# Patient Record
Sex: MALE | Race: BLACK OR AFRICAN AMERICAN | NOT HISPANIC OR LATINO | Employment: STUDENT | URBAN - METROPOLITAN AREA
[De-identification: names, ages, dates, MRNs, and addresses within clinical notes are randomized per-mention and may not be internally consistent; named-entity substitution may affect disease eponyms.]

---

## 2017-01-05 ENCOUNTER — OFFICE VISIT (OUTPATIENT)
Dept: INTERNAL MEDICINE | Facility: CLINIC | Age: 22
End: 2017-01-05
Payer: OTHER GOVERNMENT

## 2017-01-05 ENCOUNTER — HOSPITAL ENCOUNTER (OUTPATIENT)
Dept: CARDIOLOGY | Facility: HOSPITAL | Age: 22
Discharge: HOME | End: 2017-01-05
Attending: SURGERY
Payer: OTHER GOVERNMENT

## 2017-01-05 ENCOUNTER — HOSPITAL ENCOUNTER (OUTPATIENT)
Dept: RADIOLOGY | Facility: HOSPITAL | Age: 22
Discharge: HOME | End: 2017-01-05
Attending: INTERNAL MEDICINE
Payer: OTHER GOVERNMENT

## 2017-01-05 VITALS
TEMPERATURE: 98 F | HEIGHT: 72 IN | BODY MASS INDEX: 20.63 KG/M2 | OXYGEN SATURATION: 98 % | WEIGHT: 152.31 LBS | HEART RATE: 80 BPM | DIASTOLIC BLOOD PRESSURE: 71 MMHG | SYSTOLIC BLOOD PRESSURE: 115 MMHG

## 2017-01-05 DIAGNOSIS — R07.89 ATYPICAL CHEST PAIN: Primary | ICD-10-CM

## 2017-01-05 DIAGNOSIS — R07.89 ATYPICAL CHEST PAIN: ICD-10-CM

## 2017-01-05 DIAGNOSIS — J45.991 COUGH VARIANT ASTHMA: ICD-10-CM

## 2017-01-05 PROCEDURE — 93005 ELECTROCARDIOGRAM TRACING: CPT

## 2017-01-05 PROCEDURE — 71020 XR CHEST PA AND LATERAL: CPT | Mod: TC

## 2017-01-05 PROCEDURE — 99202 OFFICE O/P NEW SF 15 MIN: CPT | Mod: S$PBB,,, | Performed by: INTERNAL MEDICINE

## 2017-01-05 PROCEDURE — 71020 XR CHEST PA AND LATERAL: CPT | Mod: 26,,, | Performed by: RADIOLOGY

## 2017-01-05 PROCEDURE — 99203 OFFICE O/P NEW LOW 30 MIN: CPT | Mod: PBBFAC,PO | Performed by: INTERNAL MEDICINE

## 2017-01-05 PROCEDURE — 99999 PR PBB SHADOW E&M-NEW PATIENT-LVL III: CPT | Mod: PBBFAC,,, | Performed by: INTERNAL MEDICINE

## 2017-01-05 RX ORDER — FLUTICASONE PROPIONATE 50 MCG
2 SPRAY, SUSPENSION (ML) NASAL DAILY
Qty: 1 BOTTLE | Refills: 0 | Status: SHIPPED | OUTPATIENT
Start: 2017-01-05 | End: 2023-12-15

## 2017-01-05 RX ORDER — FLUTICASONE PROPIONATE 110 UG/1
1 AEROSOL, METERED RESPIRATORY (INHALATION) 2 TIMES DAILY
Qty: 12 G | Refills: 0 | Status: SHIPPED | OUTPATIENT
Start: 2017-01-05 | End: 2018-01-05

## 2017-01-05 NOTE — PROGRESS NOTES
Subjective:    Portions of this note are generated with voice recognition software. Typographical errors may exist.   Patient ID: Chicho Oconnor III is a 21 y.o. male.    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  The pain is in the left precordial area and is principally there when he wakes up from sleep sleep.  The pain lasts for about 2-3 minutes and subsides as he gets up.  Initially the patient thought it was heartburn but he has no symptoms suggestive of reflux or heartburn-like symptoms.  The pain is also worse when he moves in the left side.  He denies any shoulder pain.  There is no history of chest pain or shortness of breath on exertion.  No history suggestive of GERD.  No history of cough for the past 3 months.  No history of sore throat or dysphagia The patient has a history of seasonal ALLERGIES predominantly in the fall and winter in his childhood.  He used to have nasal congestion for which she took Claritin or Zyrtec.  He also has a history of cough variant asthma with onset lead in childhood and a sixth grade.  He would use albuterol when necessary.  He would be symptomatic in the fall and winter for about 2-3 months.  His symptoms where persistent coughing.  He did not require any hospitalization.  Of note The patient has not had the symptoms this year.  The patient denies any history of palpitations.  He is a nonsmoker denies any alcohol intake or any recreational drug use.  He is here for the holidays and is returning to his university tomorrow.  Other than the cough variant asthma the patient was a healthy child  active in sports.  Past medical history: Is significant for cough variant asthma  ALLERGIES: Penicillin  Social history: As above  Family history: No history of congenital heart disease sudden cardiac death or conduction  disorders.  Review of patient's allergies indicates:   Allergen Reactions    Pcn [penicillins]      History reviewed. No pertinent past medical history.  History reviewed. No pertinent past surgical history.  History reviewed. No pertinent family history.  Social History     Social History    Marital status: Single     Spouse name: N/A    Number of children: N/A    Years of education: N/A     Occupational History    Not on file.     Social History Main Topics    Smoking status: Never Smoker    Smokeless tobacco: Not on file    Alcohol use No    Drug use: Not on file    Sexual activity: Not on file     Other Topics Concern    Not on file     Social History Narrative    No narrative on file     ROS: As above      Physical Exam       Vital signs reviewed  PE:   APPEARANCE: Well nourished, well developed, in no acute distress.   HEAD: Normocephalic, atraumatic.  EYES: PERRL. EOMI. Conjunctivae noninjected.  EARS: TM's intact. Light reflex normal. No retraction or perforation  NOSE: Mucosa pink. Airway clear.  MOUTH & THROAT: No tonsillar enlargement. No pharyngeal erythema or exudate.   NECK: Supple with no cervical lymphadenopathy. No carotid bruits. No thyromegaly  CHEST: Good inspiratory effort. Lungs clear to auscultation with no wheezes or crackles.  CARDIOVASCULAR: Normal S1, S2. No rubs, murmurs, or gallops.  ABDOMEN: Bowel sounds normal. Not distended. Soft. No tenderness or masses. No organomegaly.  EXTREMITIES: No edema, cyanosis, or clubbing.   Chest wall examination: No tenderness palpable chest wall or costochondral junctions.  Left shoulder examination: Range of movements normal.  No pain elicited on passive or active movements.  No tenderness shoulder joint.    Assessment:     Labs:  No results found for this or any previous visit (from the past 1008 hour(s)).  1. Chest pain with noncardiac features    2. Cough variant asthma    Chest pain noncardiac.  Not related to exertion.  Not related to  any pulmonary complaints.  No chest wall tenderness.  Reassured the patient.  Will check chest x-ray EKG and the following labs .  Prescribed Flonase and Flovent inhaler to be taken during the 3 months he is most symptomatic.  We will consider pulmonary function testing when he is visiting this time.    Plan:   Follow-up as required or to establish care   Orders Placed This Encounter   Procedures    X-Ray Chest PA And Lateral    CBC auto differential    Comprehensive metabolic panel    TSH    EKG 12-lead

## 2017-01-06 ENCOUNTER — TELEPHONE (OUTPATIENT)
Dept: FAMILY MEDICINE | Facility: CLINIC | Age: 22
End: 2017-01-06

## 2017-01-06 NOTE — TELEPHONE ENCOUNTER
----- Message from Mira Enriquez MD sent at 1/5/2017  3:23 PM CST -----  Reviewed the results of Chest X ray is normal.

## 2017-01-12 ENCOUNTER — TELEPHONE (OUTPATIENT)
Dept: FAMILY MEDICINE | Facility: CLINIC | Age: 22
End: 2017-01-12

## 2017-01-24 NOTE — TELEPHONE ENCOUNTER
Spoke to Chicho's mom and informed of results of tests. Also informed that the chest pain could be evaluated furthe with an achocardiogram. He is at present at [facility]

## 2021-06-04 ENCOUNTER — TELEPHONE (OUTPATIENT)
Dept: GASTROENTEROLOGY | Facility: CLINIC | Age: 26
End: 2021-06-04

## 2021-06-04 ENCOUNTER — TELEPHONE (OUTPATIENT)
Dept: FAMILY MEDICINE | Facility: CLINIC | Age: 26
End: 2021-06-04

## 2021-06-25 ENCOUNTER — OFFICE VISIT (OUTPATIENT)
Dept: GASTROENTEROLOGY | Facility: CLINIC | Age: 26
End: 2021-06-25
Payer: OTHER GOVERNMENT

## 2021-06-25 VITALS — WEIGHT: 147.69 LBS | BODY MASS INDEX: 20.03 KG/M2

## 2021-06-25 DIAGNOSIS — R10.9 ABDOMINAL PAIN, UNSPECIFIED ABDOMINAL LOCATION: Primary | ICD-10-CM

## 2021-06-25 DIAGNOSIS — R19.7 DIARRHEA, UNSPECIFIED TYPE: ICD-10-CM

## 2021-06-25 PROCEDURE — 99204 OFFICE O/P NEW MOD 45 MIN: CPT | Mod: S$PBB,,, | Performed by: INTERNAL MEDICINE

## 2021-06-25 PROCEDURE — 99999 PR PBB SHADOW E&M-EST. PATIENT-LVL II: CPT | Mod: PBBFAC,,, | Performed by: INTERNAL MEDICINE

## 2021-06-25 PROCEDURE — 99204 PR OFFICE/OUTPT VISIT, NEW, LEVL IV, 45-59 MIN: ICD-10-PCS | Mod: S$PBB,,, | Performed by: INTERNAL MEDICINE

## 2021-06-25 PROCEDURE — 99212 OFFICE O/P EST SF 10 MIN: CPT | Mod: PBBFAC,PO | Performed by: INTERNAL MEDICINE

## 2021-06-25 PROCEDURE — 99999 PR PBB SHADOW E&M-EST. PATIENT-LVL II: ICD-10-PCS | Mod: PBBFAC,,, | Performed by: INTERNAL MEDICINE

## 2021-06-25 RX ORDER — DICYCLOMINE HYDROCHLORIDE 10 MG/1
10 CAPSULE ORAL 4 TIMES DAILY PRN
Qty: 120 CAPSULE | Refills: 3 | Status: SHIPPED | OUTPATIENT
Start: 2021-06-25 | End: 2021-07-25

## 2023-06-30 ENCOUNTER — OFFICE VISIT (OUTPATIENT)
Dept: URGENT CARE | Facility: CLINIC | Age: 28
End: 2023-06-30

## 2023-06-30 VITALS
OXYGEN SATURATION: 98 % | BODY MASS INDEX: 22.35 KG/M2 | TEMPERATURE: 98 F | RESPIRATION RATE: 20 BRPM | HEIGHT: 72 IN | SYSTOLIC BLOOD PRESSURE: 122 MMHG | WEIGHT: 165 LBS | HEART RATE: 82 BPM | DIASTOLIC BLOOD PRESSURE: 70 MMHG

## 2023-06-30 DIAGNOSIS — H10.9 BACTERIAL CONJUNCTIVITIS: Primary | ICD-10-CM

## 2023-06-30 PROCEDURE — 99203 OFFICE O/P NEW LOW 30 MIN: CPT | Mod: S$GLB,,,

## 2023-06-30 PROCEDURE — 99203 PR OFFICE/OUTPT VISIT, NEW, LEVL III, 30-44 MIN: ICD-10-PCS | Mod: S$GLB,,,

## 2023-06-30 RX ORDER — POLYMYXIN B SULFATE AND TRIMETHOPRIM 1; 10000 MG/ML; [USP'U]/ML
1 SOLUTION OPHTHALMIC EVERY 6 HOURS
Qty: 1.8667 ML | Refills: 0 | Status: SHIPPED | OUTPATIENT
Start: 2023-06-30 | End: 2023-07-07

## 2023-06-30 NOTE — PROGRESS NOTES
Subjective:      Patient ID: Chicho Oconnor III is a 27 y.o. male.    Vitals:  height is 6' (1.829 m) and weight is 74.8 kg (165 lb). His oral temperature is 98.3 °F (36.8 °C). His blood pressure is 122/70 and his pulse is 82. His respiration is 20 and oxygen saturation is 98%.     Chief Complaint: Eye Problem (Left)    27 year old male patient presents with left eye watering and redness x10 days. Patient states that it is also itchy. Patient states that every now and then it feels like there is something in it. Patient states only when he wakes up is his eyes are sensitive to the light.    Eye Problem   The left eye is affected. This is a new problem. The current episode started 1 to 4 weeks ago (10 days ago). The problem occurs intermittently. The problem has been gradually worsening. There was no injury mechanism. The pain is at a severity of 0/10. The patient is experiencing no pain. There is No known exposure to pink eye. He Does not wear contacts. Associated symptoms include blurred vision, eye redness and a foreign body sensation. Pertinent negatives include no eye discharge, fever, itching, nausea, recent URI or vomiting. He has tried eye drops for the symptoms. The treatment provided no relief.     Constitution: Negative for fever.   Eyes:  Positive for eye redness and blurred vision. Negative for eye discharge and eye itching.   Gastrointestinal:  Negative for nausea and vomiting.    Objective:     Physical Exam   Constitutional: He is oriented to person, place, and time. He appears well-developed.   HENT:   Head: Normocephalic and atraumatic.   Ears:   Right Ear: External ear normal.   Left Ear: External ear normal.   Nose: Nose normal.   Mouth/Throat: Oropharynx is clear and moist.   Eyes: EOM and lids are normal. Pupils are equal, round, and reactive to light. Right eye exhibits no discharge. Left eye exhibits no discharge and no exudate. Right conjunctiva is not injected. Left conjunctiva is injected.  Right eye exhibits normal extraocular motion. Left eye exhibits normal extraocular motion. Extraocular movement intact      Comments: Woods lamp exam performed and no ulcers or abrasions noted   Neck: Trachea normal and phonation normal. Neck supple.   Cardiovascular: Normal heart sounds.   Pulmonary/Chest: Breath sounds normal.   Musculoskeletal: Normal range of motion.         General: Normal range of motion.   Neurological: He is alert and oriented to person, place, and time.   Skin: Skin is warm, dry and intact.   Psychiatric: His speech is normal and behavior is normal. Judgment and thought content normal.   Nursing note and vitals reviewed.    Assessment:     1. Bacterial conjunctivitis        Plan:       Bacterial conjunctivitis  -     polymyxin B sulf-trimethoprim (POLYTRIM) 10,000 unit- 1 mg/mL Drop; Place 1 drop into the left eye every 6 (six) hours. for 7 days  Dispense: 1.8667 mL; Refill: 0      Patient Instructions   PLEASE READ YOUR DISCHARGE INSTRUCTIONS ENTIRELY AS IT CONTAINS IMPORTANT INFORMATION.     Use the antibiotic drops 4 times daily for 7 days - do not use past 10 days.      Keep hands clean.      Can use saline drops throughout the day if eyes feel dry or irritated.         Please return or see your primary care doctor if you develop new or worsening symptoms (vision changes, pain, fever, swelling around your eye).      Please arrange follow up with your primary medical clinic as soon as possible. You must understand that you've received an Urgent Care treatment only and that you may be released before all of your medical problems are known or treated. You, the patient, will arrange for follow up as instructed. If your symptoms worsen or fail to improve you should go to the Emergency Room.  WE CANNOT RULE OUT ALL POSSIBLE CAUSES OF YOUR SYMPTOMS IN THE URGENT CARE SETTING PLEASE GO TO THE ER IF YOU FEELS YOUR CONDITION IS WORSENING OR YOU WOULD LIKE EMERGENT EVALUATION.

## 2023-07-06 ENCOUNTER — TELEPHONE (OUTPATIENT)
Dept: URGENT CARE | Facility: CLINIC | Age: 28
End: 2023-07-06

## 2023-07-18 ENCOUNTER — HOSPITAL ENCOUNTER (EMERGENCY)
Facility: HOSPITAL | Age: 28
Discharge: ANOTHER HOSPITAL | End: 2023-07-18
Attending: EMERGENCY MEDICINE

## 2023-07-18 ENCOUNTER — HOSPITAL ENCOUNTER (EMERGENCY)
Facility: HOSPITAL | Age: 28
Discharge: HOME | End: 2023-07-18
Attending: EMERGENCY MEDICINE

## 2023-07-18 VITALS
RESPIRATION RATE: 16 BRPM | OXYGEN SATURATION: 99 % | WEIGHT: 165 LBS | DIASTOLIC BLOOD PRESSURE: 76 MMHG | SYSTOLIC BLOOD PRESSURE: 146 MMHG | HEART RATE: 90 BPM | TEMPERATURE: 98 F | BODY MASS INDEX: 22.35 KG/M2 | HEIGHT: 72 IN

## 2023-07-18 VITALS
TEMPERATURE: 99 F | RESPIRATION RATE: 16 BRPM | HEART RATE: 70 BPM | DIASTOLIC BLOOD PRESSURE: 75 MMHG | SYSTOLIC BLOOD PRESSURE: 133 MMHG | OXYGEN SATURATION: 98 %

## 2023-07-18 DIAGNOSIS — H53.132 ACUTE LOSS OF VISION, LEFT: Primary | ICD-10-CM

## 2023-07-18 DIAGNOSIS — H20.9 UVEITIS: Primary | ICD-10-CM

## 2023-07-18 DIAGNOSIS — H10.32 ACUTE CONJUNCTIVITIS OF LEFT EYE, UNSPECIFIED ACUTE CONJUNCTIVITIS TYPE: ICD-10-CM

## 2023-07-18 PROCEDURE — 25000003 PHARM REV CODE 250: Performed by: PHYSICIAN ASSISTANT

## 2023-07-18 PROCEDURE — 25000003 PHARM REV CODE 250: Performed by: EMERGENCY MEDICINE

## 2023-07-18 PROCEDURE — 25000003 PHARM REV CODE 250: Performed by: STUDENT IN AN ORGANIZED HEALTH CARE EDUCATION/TRAINING PROGRAM

## 2023-07-18 PROCEDURE — 99285 EMERGENCY DEPT VISIT HI MDM: CPT

## 2023-07-18 PROCEDURE — 99283 EMERGENCY DEPT VISIT LOW MDM: CPT | Mod: 27

## 2023-07-18 RX ORDER — PROPARACAINE HYDROCHLORIDE 5 MG/ML
1 SOLUTION/ DROPS OPHTHALMIC
Status: COMPLETED | OUTPATIENT
Start: 2023-07-18 | End: 2023-07-18

## 2023-07-18 RX ORDER — PREDNISOLONE ACETATE 10 MG/ML
1 SUSPENSION/ DROPS OPHTHALMIC
Status: DISCONTINUED | OUTPATIENT
Start: 2023-07-18 | End: 2023-07-19 | Stop reason: HOSPADM

## 2023-07-18 RX ADMIN — FLUORESCEIN SODIUM 1 EACH: 1 STRIP OPHTHALMIC at 08:07

## 2023-07-18 RX ADMIN — FLUORESCEIN SODIUM 1 EACH: 1 STRIP OPHTHALMIC at 04:07

## 2023-07-18 RX ADMIN — FLUORESCEIN SODIUM 1 EACH: 1 STRIP OPHTHALMIC at 03:07

## 2023-07-18 RX ADMIN — PROPARACAINE HYDROCHLORIDE 1 DROP: 5 SOLUTION/ DROPS OPHTHALMIC at 08:07

## 2023-07-18 RX ADMIN — PROPARACAINE HYDROCHLORIDE 1 DROP: 5 SOLUTION/ DROPS OPHTHALMIC at 03:07

## 2023-07-18 RX ADMIN — PREDNISOLONE ACETATE 1 DROP: 10 SUSPENSION/ DROPS OPHTHALMIC at 10:07

## 2023-07-18 NOTE — ED TRIAGE NOTES
Reports ongoing problems with left eye x 3 weeks. States initially dx with conjunctivitis and treated with an eye drop that he was allergic to. Stopped drops last week but has has continued irritation to left eye and now with swelling. Has not seen ophthalmology. Presents awake, alert with eye patch over left eye. No distress.

## 2023-07-18 NOTE — ED PROVIDER NOTES
Encounter Date: 7/18/2023       History     Chief Complaint   Patient presents with    Eye Problem     Redness to the left eye that he was treat at urgent care for one week ago. Eye became swollen and vision became blurry. Went back to urgent care where he was told to stop using them due to his penicillin allergy. Was given a cream for his eye but it has remained to swollen to use it.     This is a 27-year-old  male who presents to ED with complaint of redness and blurry vision of the left eye x3 weeks.  He states roughly 2 weeks ago he was seen at an urgent Care was diagnosed with bacterial conjunctivitis and was written polymyxin B/trimethoprim sulfamethoxazole drops for which he feels like he has an allergy to sulfa and it irritated further.  He states after he stops the drops swelling did improve but has not gone away.  At that time during the 2nd visit they wrote him erythromycin ointment but he has been unable to use it secondary to swelling in the eye.  Associated symptoms include blurry vision and photophobia.  He denies any pain with eye movement.  He denies any erythema, warmth surrounding the left eye.    Review of patient's allergies indicates:   Allergen Reactions    Polymyxin b     Pcn [penicillins] Hives     No past medical history on file.  No past surgical history on file.  Family History   Problem Relation Age of Onset    Colon cancer Neg Hx      Social History     Tobacco Use    Smoking status: Never   Substance Use Topics    Alcohol use: No    Drug use: Never     Review of Systems   Constitutional:  Negative for fever.   Eyes:  Positive for photophobia, discharge, redness and visual disturbance.   Respiratory:  Negative for cough and shortness of breath.    Cardiovascular:  Negative for chest pain and palpitations.   Skin:  Negative for color change and pallor.   Neurological:  Positive for headaches. Negative for dizziness.   Psychiatric/Behavioral:  Negative for agitation and  behavioral problems. The patient is nervous/anxious.      Physical Exam     Initial Vitals [07/18/23 1420]   BP Pulse Resp Temp SpO2   (!) 146/76 90 16 98.2 °F (36.8 °C) 99 %      MAP       --         Physical Exam    Constitutional: He appears well-developed and well-nourished.   HENT:   Head: Normocephalic and atraumatic.   Right Ear: External ear normal.   Left Ear: External ear normal.   Mouth/Throat: Oropharynx is clear and moist.   Eyes: EOM are normal. Pupils are equal, round, and reactive to light. Lids are everted and swept, no foreign bodies found. Left conjunctiva is injected. Left eye exhibits normal extraocular motion and no nystagmus.   Fundoscopic exam:       The left eye shows no exudate.   Tonometer readings for the affected eye:  42, 38, 24 (20, 17, 15 by attending)    Tonometry readings for the unaffected:  18, 18, and 22    There is significant swelling, erythema.   Cardiovascular:  Normal rate, regular rhythm and normal heart sounds.           Pulmonary/Chest: Breath sounds normal. He has no wheezes.     Neurological: He is alert and oriented to person, place, and time.   Skin: Skin is warm and dry.   Psychiatric: He has a normal mood and affect. Thought content normal.       ED Course   Procedures  Labs Reviewed - No data to display       Imaging Results    None          Medications   fluorescein ophthalmic strip 1 each (1 each Left Eye Given by Provider 7/18/23 1516)   proparacaine 0.5 % ophthalmic solution 1 drop (1 drop Left Eye Given by Provider 7/18/23 1516)   fluorescein ophthalmic strip 1 each (1 each Left Eye Given by Provider 7/18/23 1600)     Medical Decision Making:   Initial Assessment:   This is a 27-year-old  male that presents the ED with 3 weeks of left eye erythema and discomfort.  He also has had acute vision loss in his left eye over the last 5 days.  Differential Diagnosis:   Conjunctivitis, iritis, glaucoma, keratitis  ED Management:  This is a 27-year-old   male that presents to ED with 3 weeks of left eye erythema and discomfort as well as 5 days of acute vision loss in the left eye.  On exam there is bulging and swelling of the left eye to the point he is unable to keep the eye open.  There is also significant erythema of the conjunctiva.  There was no pain with extraocular movements and no signs of periorbital or orbital cellulitis.  Fluorescein stain shows no uptake, no foreign body, or corneal abrasion.  Original tonometer readings for the affected eye were 42, 38, 24 respectively.  When repeated the affected eye readings were 20, 17, 15.  Unaffected eye readings were 18, 18, 22.  I did have the help of my attending Dr. Jimenez during my evaluation of this patient.  After communicating with him thoroughly about this patient we decided to call the transfer center speak with Ophthalmology.  I spoke to Dr. Dean of Ophthalmology at [facility] and he agreed to an ED to ED transfer of the patient.  The patient traveled to [facility] with his father driving.  Risks of this choice were explained.  They were instructed to go straight to the [facility] ED.  They both verbalized understanding and were agreeable to this plan.                        Clinical Impression:   Final diagnoses:  [H53.132] Acute loss of vision, left (Primary)  [H10.32] Acute conjunctivitis of left eye, unspecified acute conjunctivitis type        ED Disposition Condition    Transfer to Another Facility Adam Malloy PA-C  07/18/23 2053

## 2023-07-19 NOTE — DISCHARGE INSTRUCTIONS
Return to the emergency room if you have worsening of symptoms, changes in vision, eye pain, flashes or floaters.

## 2023-07-19 NOTE — ED PROVIDER NOTES
Encounter Date: 7/18/2023       History     Chief Complaint   Patient presents with    Conjunctivitis     Allergic reaction to polymyxin (given for current conjunctivitis). Now left eye swollen shut.      HPI  Elvin is a 27 y.o. M with no significant PMH transferred here for ophtho eval.  He has had 3 weeks of L eye redness.  Initially treated with polytrim drops without improvement and switched to erythromycin ointment on Friday.  However he has had increasing swelling and redness and blurry vision.  He has photophobia but otherwise not significant eye pain. Denies flashes or floaters or trauma.  Denies fever, joint pains, or other systemic symptoms.  Review of patient's allergies indicates:   Allergen Reactions    Polymyxin b     Pcn [penicillins] Hives     No past medical history on file.  No past surgical history on file.  Family History   Problem Relation Age of Onset    Colon cancer Neg Hx      Social History     Tobacco Use    Smoking status: Never   Substance Use Topics    Alcohol use: No    Drug use: Never     Review of Systems    Physical Exam     Initial Vitals [07/18/23 1726]   BP Pulse Resp Temp SpO2   (!) 150/81 92 16 98.5 °F (36.9 °C) 98 %      MAP       --         Physical Exam  General: Awake and alert, well-nourished  HENT: moist mucous membranes  Eyes: Severe L conjunctival injection and tearing, no significant fluorescein uptake.  Can count fingers and see color with L eye but otherwise cannot do vision testing with L eye.   Pulm: CTAB, no increased work of breathing  CV: Regular rate and rhythm, no murmur noted  Abdomen: Nondistended  MSK: No LE edema  Skin: No rash noted  Neuro: No facial asymmetry, grossly normal movements of arms and legs  Psychiatric: Cooperative    ED Course   Procedures  Labs Reviewed - No data to display         Imaging Results    None          Medications   fluorescein ophthalmic strip 1 each (1 each Right Eye Given 7/18/23 2023)   proparacaine 0.5 % ophthalmic solution  1 drop (1 drop Both Eyes Given 7/18/23 2024)     Medical Decision Making:   Differential Diagnosis:   Allergic vs viral conjunctivitis, allergic reaction, uveitis, iritis, keratitis,  episcleritis  ED Management:  Pt systemically well but L eye with significant decreased vision and conjunctival erythema and tearing.  Ophthalmology was consulted.  They felt likely uveitis and recommend pred forte drops q2h which were started and provided for discharge.  Follow up in ophthalmology clinic on Thursday.  Return precautions given.                        Clinical Impression:   Final diagnoses:  [H20.9] Uveitis (Primary)        ED Disposition Condition    Discharge Stable          ED Prescriptions    None       Follow-up Information       Follow up With Specialties Details Why Contact Info Additional Information    Rachna Quiros MD Pediatrics  As needed [address] [facility] [id number] [phone]       [facility] - [address] [place] [address] Ophthalmology   [address] [facility] [address] [phone] Please arrive on the 10th floor for check-in.             Randolph Hinkle MD  07/21/23 8055

## 2023-07-19 NOTE — CONSULTS
"Consultation Report  Ophthalmology Service    Date: 07/18/2023    Reason for Consult: "L eye red and can only count fingers, very poor vision"     History of Present Illness: Chicho Oconnor III is a 27 y.o. male with no prior medical or ocular history who presented to [facility]. Ophthalmology is being consulted to evaluate for poor vision and red L eye.      Patient states that he noticed three weeks of eye redness, tearing, and photosensitivity. Initially he reports that he was diagnosed with conjunctivitis when he went to urgent care the next day, was placed on poly/trim drops, which he recently discontinued on Saturday due to no improvement of his eye redness. Affirms that his vision has decreased but attributes this to difficulty opening his eye. Denies flashes, floaters, or curtain-veil in visual field ou. Denies any ocular discomfort ou.    POcularHx: Denies history of ocular problems or past ocular surgeries.    Current eye gtts: Had been using poly/trim eye drops, stopped saturday.    Family Hx: Denies family history of glaucoma, macular degeneration. family history is not on file. Patient has family history of crohn's disease in sister and Retinitis pigmentosa in paternal grandmother.     PMHx:  has no past medical history on file.     PSurgHx:  has no past surgical history on file.     Home Medications:   Prior to Admission medications    Medication Sig Start Date End Date Taking? Authorizing Provider   fluticasone (FLONASE) 50 mcg/actuation nasal spray 2 sprays by Each Nare route once daily.  Patient not taking: Reported on 6/25/2021 1/5/17   Mira Enriquez MD        Medications this encounter:     Allergies: is allergic to polymyxin b and pcn [penicillins].     Social:  reports that he has never smoked. He does not have any smokeless tobacco history on file. He reports that he does not drink alcohol and does not use drugs.     ROS: As per HPI    Ocular examination/Dilated fundus examination:  Base Eye " Exam       Visual Acuity (Snellen - Linear)         Right Left    Dist sc 20/20 CF @ 6'    Dist ph sc  20/40 -2              Tonometry (Tonopen, 9:57 PM)         Right Left    Pressure 15 23              Pupils         Dark Light Shape React APD    Right 3 2 Round Brisk None    Left 3 2 Round Brisk None              Visual Fields         Right Left     Full Full              Extraocular Movement         Right Left     Full, Ortho Full, Ortho              Dilation       Both eyes: 1% Mydriacyl, 2.5% Phenylephrine @ 10:40 PM   Poor dilation despite 2 rounds of drops OD                 Slit Lamp and Fundus Exam       External Exam         Right Left    External Normal Normal              Slit Lamp Exam         Right Left    Lids/Lashes Normal Normal    Conjunctiva/Sclera White and quiet 2+ injection    Cornea Clear fine small pigmented KPs throughout cornea. Small subepithelial ring shaped infiltrates near limbus inferiorly. Diffuse fine PEE.    Anterior Chamber Deep and quiet Deep with flare and 4+ cell    Iris Round and reactive Round and reactive    Lens Clear Clear    Anterior Vitreous Normal Normal              Fundus Exam         Right Left    Disc pink,sharp pink, sharp    C/D Ratio 0.3 0.4    Macula Normal Normal    Vessels arcades visualized near macula, appear normal Normal    Periphery poor view flat w/o holes, tears, detachments 360    Poor view in right eye due to poor dilation                      Assessment/Plan:     Anterior  Uveitis    - Patient with three weeks of eye redness, photosensitivity, and tearing.  - vision 20/20 // CF @ 6' -> pinholes to 20/40 -2. No prior history of glasses or contact lens usage. IOP 15 // 23.  OS with flare and 4+ cell. Fine pigmented Kps (non-granulomatous) noted on cornea  - family hx of Crohn's disease. Denies any symptoms of arthritis, inflammatory bowel disease. No past medical history.  - DFE normal without signs of lesions or vitritis.   - first episode of uveitis,  given lack of medical history of symptoms concerning for granulomatous disease, non-granulomatous keratic precipitates, will defer lab work up  - will follow in clinic Thursday to monitor inflammation    Recommendations  - stop polymyxin B/trimethoprim drops  - Pred-forte 1% Q2H while awake  - will follow in triage clinic Thursday and future follow up with cornea clinic. Will need repeat dilation given poor view OD.   - Strict return precautions if patient experiences worsening of symptoms, changes in vision, eye pain, flashes or floaters.     Patient's Best Contact Number: [phone]    Srikanth Peña MD   [facility]hthalmology  07/18/2023  9:48 PM

## 2023-07-19 NOTE — ED TRIAGE NOTES
Chicho Oconnor III, a 27 y.o. male presents to the ED w/ complaint of left eye swelling following administration of drops for pink eye for the last 6 days    Triage note:  Chief Complaint   Patient presents with    Conjunctivitis     Allergic reaction to polymyxin (given for current conjunctivitis). Now left eye swollen shut.      Review of patient's allergies indicates:   Allergen Reactions    Polymyxin b     Pcn [penicillins] Hives     No past medical history on file.     LOC: The patient is awake, alert, aware of environment with an appropriate affect. Oriented x4, speaking appropriately  APPEARANCE: Pt resting comfortably, in no acute distress, pt is clean and well groomed, clothing properly fastened  SKIN:The skin is warm and dry, color consistent with ethnicity, patient has normal skin turgor and moist mucus membranes, no bruising noted  RESPIRATORY:Airway is open and patent, respirations are spontaneous, patient has a normal effort and rate, no accessory muscle use noted.  CARDIAC: Normal rate and rhythm, no peripheral edema noted, capillary refill < 3 seconds, bilateral radial pulses 2+.  ABDOMEN: Soft, non tender, non distended.   NEUROLOGIC: PERRLA, facial expression is symmetrical, patient moving all extremities spontaneously, normal sensation in all extremities when touched with a finger.  Follows all commands appropriately  MUSCULOSKELETAL: Patient moving all extremities spontaneously, no obvious swelling or deformities noted.   EENT: Obvious swelling and irritation to left eye lid. Reddened sclera present to left eye

## 2023-07-20 ENCOUNTER — OFFICE VISIT (OUTPATIENT)
Dept: OPHTHALMOLOGY | Facility: CLINIC | Age: 28
End: 2023-07-20

## 2023-07-20 ENCOUNTER — TELEPHONE (OUTPATIENT)
Dept: OPHTHALMOLOGY | Facility: CLINIC | Age: 28
End: 2023-07-20

## 2023-07-20 DIAGNOSIS — H20.012 PRIMARY IRIDOCYCLITIS OF LEFT EYE: Primary | ICD-10-CM

## 2023-07-20 PROCEDURE — 99202 PR OFFICE/OUTPT VISIT, NEW, LEVL II, 15-29 MIN: ICD-10-PCS | Mod: S$PBB,,, | Performed by: OPHTHALMOLOGY

## 2023-07-20 PROCEDURE — 99999 PR PBB SHADOW E&M-EST. PATIENT-LVL II: CPT | Mod: PBBFAC,,, | Performed by: OPHTHALMOLOGY

## 2023-07-20 PROCEDURE — 99999 PR PBB SHADOW E&M-EST. PATIENT-LVL II: ICD-10-PCS | Mod: PBBFAC,,, | Performed by: OPHTHALMOLOGY

## 2023-07-20 PROCEDURE — 99202 OFFICE O/P NEW SF 15 MIN: CPT | Mod: S$PBB,,, | Performed by: OPHTHALMOLOGY

## 2023-07-20 PROCEDURE — 99212 OFFICE O/P EST SF 10 MIN: CPT | Mod: PBBFAC | Performed by: OPHTHALMOLOGY

## 2023-07-20 RX ORDER — VALACYCLOVIR HYDROCHLORIDE 500 MG/1
500 TABLET, FILM COATED ORAL 3 TIMES DAILY
Qty: 30 TABLET | Refills: 0 | Status: SHIPPED | OUTPATIENT
Start: 2023-07-20 | End: 2023-08-03 | Stop reason: SDUPTHER

## 2023-07-20 NOTE — PROGRESS NOTES
HPI    Triage pt  Patient here for ed follow up uveitis OS.  OS feeling much better, but still little light sensitivity and tearing.  Vision has improved.  Eye drops:Pred forte q2h OS    I have personally interviewed the patient, reviewed the history and   examined the patient and agree with the technician's exam.   Last edited by Cole Dean MD on 7/20/2023  3:07 PM.        ROS    Negative for: Psychiatric  Last edited by Cole Dean MD on 7/20/2023  3:13 PM.        Assessment /Plan     For exam results, see Encounter Report.    Primary iridocyclitis of left eye      Seen with Dr. Kenney. Agrees with continued PF at 4x per day and add Valtrex 500  mg 3x per day for  10 days. Return one week.

## 2023-07-20 NOTE — TELEPHONE ENCOUNTER
----- Message from Chaya Crisostomo sent at 7/20/2023 10:21 AM CDT -----  Contact: pt @ [phone]  Chicho Oconnor calling regarding Appointment Access  (message) for #pt is callihng to get np appt, pt has referral in chart and was told to come back today for appt. Asking for call back

## 2023-07-20 NOTE — PATIENT INSTRUCTIONS
Seen with Dr. Kenney. Agrees with continued PF at 4x per day and add Valtrex 500  mg 3x per day for  10 days. Return one week.

## 2023-07-28 ENCOUNTER — OFFICE VISIT (OUTPATIENT)
Dept: OPHTHALMOLOGY | Facility: CLINIC | Age: 28
End: 2023-07-28

## 2023-07-28 DIAGNOSIS — H20.012 PRIMARY IRIDOCYCLITIS OF LEFT EYE: Primary | ICD-10-CM

## 2023-07-28 PROCEDURE — 99212 OFFICE O/P EST SF 10 MIN: CPT | Mod: PBBFAC | Performed by: OPHTHALMOLOGY

## 2023-07-28 PROCEDURE — 99214 PR OFFICE/OUTPT VISIT, EST, LEVL IV, 30-39 MIN: ICD-10-PCS | Mod: S$PBB,,, | Performed by: OPHTHALMOLOGY

## 2023-07-28 PROCEDURE — 99214 OFFICE O/P EST MOD 30 MIN: CPT | Mod: S$PBB,,, | Performed by: OPHTHALMOLOGY

## 2023-07-28 PROCEDURE — 99999 PR PBB SHADOW E&M-EST. PATIENT-LVL II: CPT | Mod: PBBFAC,,, | Performed by: OPHTHALMOLOGY

## 2023-07-28 PROCEDURE — 99999 PR PBB SHADOW E&M-EST. PATIENT-LVL II: ICD-10-PCS | Mod: PBBFAC,,, | Performed by: OPHTHALMOLOGY

## 2023-07-28 RX ORDER — DIFLUPREDNATE OPHTHALMIC 0.5 MG/ML
1 EMULSION OPHTHALMIC 4 TIMES DAILY
Qty: 5 ML | Refills: 3 | Status: SHIPPED | OUTPATIENT
Start: 2023-07-28 | End: 2023-12-15

## 2023-07-28 NOTE — PROGRESS NOTES
HPI    Ref Dr. Dean    Iridocyclitis OS /keratouveitis probable hsv    Valtrex 500 mg TID   PF qid OS ( stopped on yesterday)     C/o: Pt states his OS is feeling better.   Last edited by Karlee Kenney MD on 7/28/2023 10:09 AM.            Assessment /Plan     For exam results, see Encounter Report.    Primary iridocyclitis of left eye    Other orders  -     difluprednate (DUREZOL) 0.05 % Drop ophthalmic solution; Place 1 drop into both eyes 4 (four) times daily.  Dispense: 5 mL; Refill: 3      Keratouveitis - probable hsv.    Subjecitvely improved however still with a lot of inflammation.    Change PF to durezol qid, cont valtrex.                 Pt subjectively much improved on valtrex- cont tid x 10 days then decrease to QD long term    Will change PF to durezol QID until next visit.    Va/iop next

## 2023-08-03 ENCOUNTER — OFFICE VISIT (OUTPATIENT)
Dept: OPHTHALMOLOGY | Facility: CLINIC | Age: 28
End: 2023-08-03

## 2023-08-03 DIAGNOSIS — H20.012 PRIMARY IRIDOCYCLITIS OF LEFT EYE: Primary | ICD-10-CM

## 2023-08-03 PROCEDURE — 99214 OFFICE O/P EST MOD 30 MIN: CPT | Mod: S$PBB,,, | Performed by: OPHTHALMOLOGY

## 2023-08-03 PROCEDURE — 99212 OFFICE O/P EST SF 10 MIN: CPT | Mod: PBBFAC | Performed by: OPHTHALMOLOGY

## 2023-08-03 PROCEDURE — 99999 PR PBB SHADOW E&M-EST. PATIENT-LVL II: CPT | Mod: PBBFAC,,, | Performed by: OPHTHALMOLOGY

## 2023-08-03 PROCEDURE — 99214 PR OFFICE/OUTPT VISIT, EST, LEVL IV, 30-39 MIN: ICD-10-PCS | Mod: S$PBB,,, | Performed by: OPHTHALMOLOGY

## 2023-08-03 PROCEDURE — 99999 PR PBB SHADOW E&M-EST. PATIENT-LVL II: ICD-10-PCS | Mod: PBBFAC,,, | Performed by: OPHTHALMOLOGY

## 2023-08-03 RX ORDER — VALACYCLOVIR HYDROCHLORIDE 500 MG/1
500 TABLET, FILM COATED ORAL DAILY
Qty: 30 TABLET | Refills: 3 | Status: SHIPPED | OUTPATIENT
Start: 2023-08-03 | End: 2023-12-15

## 2023-08-03 NOTE — PROGRESS NOTES
HPI    Ref Dr. Dean    Iridocyclitis OS    GTTS:  Valtrex 500 mg QID - Last dose yesterday    Durezol QID OS    Pt here today for iridocyclitis f/u. Pt states that symptoms have   improved. Pt denies eye pain.  Last edited by Mireya Meeks MA on 8/3/2023 10:49 AM.            Assessment /Plan     For exam results, see Encounter Report.    Primary iridocyclitis of left eye  -     valACYclovir (VALTREX) 500 MG tablet; Take 1 tablet (500 mg total) by mouth once daily.  Dispense: 30 tablet; Refill: 3        Keratouveitis - probable hsv.    Subjecitvely improved however still with a lot of inflammation.    MUCH improved on durezol.    Decr durezol to BID x 1 wk then QD until you see me again.    Valtrex 1 pill a day

## 2023-10-03 ENCOUNTER — OFFICE VISIT (OUTPATIENT)
Dept: OPHTHALMOLOGY | Facility: CLINIC | Age: 28
End: 2023-10-03
Payer: COMMERCIAL

## 2023-10-03 DIAGNOSIS — H20.012 PRIMARY IRIDOCYCLITIS OF LEFT EYE: Primary | ICD-10-CM

## 2023-10-03 PROCEDURE — 1159F PR MEDICATION LIST DOCUMENTED IN MEDICAL RECORD: ICD-10-PCS | Mod: CPTII,S$GLB,, | Performed by: OPHTHALMOLOGY

## 2023-10-03 PROCEDURE — 99999 PR PBB SHADOW E&M-EST. PATIENT-LVL II: ICD-10-PCS | Mod: PBBFAC,,, | Performed by: OPHTHALMOLOGY

## 2023-10-03 PROCEDURE — 1159F MED LIST DOCD IN RCRD: CPT | Mod: CPTII,S$GLB,, | Performed by: OPHTHALMOLOGY

## 2023-10-03 PROCEDURE — 99999 PR PBB SHADOW E&M-EST. PATIENT-LVL II: CPT | Mod: PBBFAC,,, | Performed by: OPHTHALMOLOGY

## 2023-10-03 PROCEDURE — 99214 PR OFFICE/OUTPT VISIT, EST, LEVL IV, 30-39 MIN: ICD-10-PCS | Mod: S$GLB,,, | Performed by: OPHTHALMOLOGY

## 2023-10-03 PROCEDURE — 99214 OFFICE O/P EST MOD 30 MIN: CPT | Mod: S$GLB,,, | Performed by: OPHTHALMOLOGY

## 2023-10-03 NOTE — PROGRESS NOTES
HPI    Ref Dr. Dean    Iridocyclitis OS    GTTS:  Valtrex 500 mg QID *Need refills   Durezol QID OS *Need refills    Pt here today for iridocyclitis f/u OS.  Pt. States vision is doing well.  Pt. States treatment have helped his left eye heal.  Pt. States if he miss drop his eye becomes very sensitive to light   Pt. Denies pain or discomfort.  Last edited by Yamile Montero on 10/3/2023 11:02 AM.            Assessment /Plan     For exam results, see Encounter Report.    Primary iridocyclitis of left eye        Keratouveitis - probable hsv.     MUCH improved on durezol.     durezol to BID x 1 wk then QD x 1 wk then stop    Valtrex 1 pill a day for 2 more weeks then stop    F/up optom 6 mo REE, sooner prn

## 2023-12-15 ENCOUNTER — OFFICE VISIT (OUTPATIENT)
Dept: OPHTHALMOLOGY | Facility: CLINIC | Age: 28
End: 2023-12-15
Payer: COMMERCIAL

## 2023-12-15 DIAGNOSIS — H20.012 PRIMARY IRIDOCYCLITIS OF LEFT EYE: Primary | ICD-10-CM

## 2023-12-15 PROCEDURE — 99999 PR PBB SHADOW E&M-EST. PATIENT-LVL III: ICD-10-PCS | Mod: PBBFAC,,, | Performed by: OPHTHALMOLOGY

## 2023-12-15 PROCEDURE — 99214 OFFICE O/P EST MOD 30 MIN: CPT | Mod: S$GLB,,, | Performed by: OPHTHALMOLOGY

## 2023-12-15 PROCEDURE — 1159F MED LIST DOCD IN RCRD: CPT | Mod: CPTII,S$GLB,, | Performed by: OPHTHALMOLOGY

## 2023-12-15 PROCEDURE — 99999 PR PBB SHADOW E&M-EST. PATIENT-LVL III: CPT | Mod: PBBFAC,,, | Performed by: OPHTHALMOLOGY

## 2023-12-15 PROCEDURE — 99214 PR OFFICE/OUTPT VISIT, EST, LEVL IV, 30-39 MIN: ICD-10-PCS | Mod: S$GLB,,, | Performed by: OPHTHALMOLOGY

## 2023-12-15 PROCEDURE — 1159F PR MEDICATION LIST DOCUMENTED IN MEDICAL RECORD: ICD-10-PCS | Mod: CPTII,S$GLB,, | Performed by: OPHTHALMOLOGY

## 2023-12-15 RX ORDER — PREDNISOLONE ACETATE 10 MG/ML
1 SUSPENSION/ DROPS OPHTHALMIC 3 TIMES DAILY
Qty: 5 ML | Refills: 3 | Status: SHIPPED | OUTPATIENT
Start: 2023-12-15 | End: 2024-01-14

## 2023-12-15 NOTE — PATIENT INSTRUCTIONS
PRED FORTE - SHAKE WELL - 3 TIMES A DAY FOR 1 WK THEN DECREASE TO TWICE A DAY FOR 1 WK THEN DAILY UNTIL YOU SEE ME AGAIN.

## 2023-12-15 NOTE — PROGRESS NOTES
HPI    Ref Dr. Dean    Iridocyclitis OS    Pt here for possible irdiocyclitis OS flare up. Patient states OS became   red and light sensitive about 3 days after tapering off durezol. Restarted   the drops once a day and tried to ween off of them again but flared up   again. Wanted to know if he needed to stay on the drops.   Last edited by Sakina Israel MA on 12/15/2023  3:38 PM.            Assessment /Plan     For exam results, see Encounter Report.    Primary iridocyclitis of left eye    Other orders  -     prednisoLONE acetate (PRED FORTE) 1 % DrpS; Place 1 drop into the left eye 3 (three) times daily.  Dispense: 5 mL; Refill: 3      Rebound iritis OS                 Has been on steroids since oct 20th - he restarted after stopping them for a few days after our last visit.    Encourage pt to follow instructions and not use steroids PRN:    PRED FORTE - SHAKE WELL - 3 TIMES A DAY FOR 1 WK THEN DECREASE TO TWICE A DAY FOR 1 WK THEN DAILY UNTIL YOU SEE ME AGAIN.    THEN TRANSIITION to mild

## 2024-01-11 ENCOUNTER — TELEPHONE (OUTPATIENT)
Dept: OPHTHALMOLOGY | Facility: CLINIC | Age: 29
End: 2024-01-11
Payer: COMMERCIAL

## 2024-01-29 ENCOUNTER — OFFICE VISIT (OUTPATIENT)
Dept: OPHTHALMOLOGY | Facility: CLINIC | Age: 29
End: 2024-01-29
Payer: COMMERCIAL

## 2024-01-29 DIAGNOSIS — H20.012 PRIMARY IRIDOCYCLITIS OF LEFT EYE: Primary | ICD-10-CM

## 2024-01-29 PROCEDURE — 99214 OFFICE O/P EST MOD 30 MIN: CPT | Mod: S$GLB,,, | Performed by: OPHTHALMOLOGY

## 2024-01-29 NOTE — PROGRESS NOTES
HPI    Ref Dr. Dean    Iridocyclitis OS    Gtt's:  Pred Forte QD OS    Pt here for possible irdiocyclitis OS flare up.   Pt. States vision is doing well.  Pt. State eye is doing much better since he started using drops.  Pt. Denies pain or discomfort.  Last edited by Yamile Montero on 1/29/2024 11:20 AM.            Assessment /Plan     For exam results, see Encounter Report.    Primary iridocyclitis of left eye        Rebound iritis OS    Had been on steroids since oct 20th - he restarted after stopping them for a few days after our last visit.    Encourage pt to follow instructions and not use steroids PRN:  \    OS quiet today -- can stop PF and start FML QD x 2 wks then stop

## 2024-06-28 ENCOUNTER — OFFICE VISIT (OUTPATIENT)
Dept: FAMILY MEDICINE | Facility: CLINIC | Age: 29
End: 2024-06-28
Payer: COMMERCIAL

## 2024-06-28 VITALS
OXYGEN SATURATION: 99 % | DIASTOLIC BLOOD PRESSURE: 54 MMHG | BODY MASS INDEX: 24.4 KG/M2 | TEMPERATURE: 98 F | WEIGHT: 180.13 LBS | HEART RATE: 76 BPM | SYSTOLIC BLOOD PRESSURE: 101 MMHG | HEIGHT: 72 IN

## 2024-06-28 DIAGNOSIS — Z11.59 ENCOUNTER FOR HEPATITIS C SCREENING TEST FOR LOW RISK PATIENT: ICD-10-CM

## 2024-06-28 DIAGNOSIS — Z76.89 ENCOUNTER TO ESTABLISH CARE WITH NEW DOCTOR: ICD-10-CM

## 2024-06-28 DIAGNOSIS — Z11.4 SCREENING FOR HIV (HUMAN IMMUNODEFICIENCY VIRUS): ICD-10-CM

## 2024-06-28 DIAGNOSIS — Z00.00 ANNUAL PHYSICAL EXAM: Primary | ICD-10-CM

## 2024-06-28 DIAGNOSIS — G43.009 MIGRAINE WITHOUT AURA AND WITHOUT STATUS MIGRAINOSUS, NOT INTRACTABLE: ICD-10-CM

## 2024-06-28 DIAGNOSIS — K21.9 GASTROESOPHAGEAL REFLUX DISEASE, UNSPECIFIED WHETHER ESOPHAGITIS PRESENT: ICD-10-CM

## 2024-06-28 PROBLEM — G43.109 MIGRAINE WITH AURA AND WITHOUT STATUS MIGRAINOSUS, NOT INTRACTABLE: Status: ACTIVE | Noted: 2024-06-28

## 2024-06-28 PROCEDURE — 99999 PR PBB SHADOW E&M-EST. PATIENT-LVL III: CPT | Mod: PBBFAC,,, | Performed by: FAMILY MEDICINE

## 2024-06-28 NOTE — PROGRESS NOTES
(Portions of this note were dictated using voice recognition software and may contain dictation related errors in spelling/grammar/syntax not found on text review)    CC:   Chief Complaint   Patient presents with    Migraine    Establish Care       HPI: 28 y.o. male presented to [facility] as a new patient for routine annual checkup and labs.  He had no PCP and blood workup done for several years, would like to have complete checkup and labs done.      He reports having frequent headaches lately, , few weeks ago reports having daily headache, described as pulsating feeling, lasting for 1-2 hours, located all over the head, denies having any associated symptoms of vision changes, any arm symptoms of nausea or vomiting or dizziness.  He was taking Tylenol/ibuprofen and symptoms improved.  Patient stated that now they are less frequent.    He reports having symptoms of acid reflux and heartburn, stated that he snacks after midnight, also smokes marijuana throughout the night, states food regurgitation with metallic taste in the mouth.  He has tried taking over-the-counter Tums with some relief.    He is due for annual labs.      He smokes marijuana, does not smoke cigarettes.      He is physically active and tries to do exercise sometimes, states doing body building with weights.    Denies having any other symptoms or concerns.    Past Medical History:   Diagnosis Date    Iridocyclitis of left eye        History reviewed. No pertinent surgical history.    Family History   Problem Relation Name Age of Onset    Colon cancer Neg Hx         Social History     Tobacco Use    Smoking status: Never   Substance Use Topics    Alcohol use: No    Drug use: Never       Lab Results   Component Value Date    WBC 8.36 01/05/2017    HGB 14.9 01/05/2017    HCT 44.9 01/05/2017    MCV 80 (L) 01/05/2017     01/05/2017    ALT 61 (H) 01/05/2017    AST 42 (H) 01/05/2017    BILITOT 0.5 01/05/2017    ALKPHOS 163 (H) 01/05/2017    NA  138 01/05/2017    K 3.9 01/05/2017     01/05/2017    CREATININE 0.9 01/05/2017    ESTGFRAFRICA >60 01/05/2017    EGFRNONAA >60 01/05/2017    CALCIUM 9.7 01/05/2017    ALBUMIN 3.9 01/05/2017    BUN 14 01/05/2017    CO2 29 01/05/2017    TSH 0.875 01/05/2017     01/05/2017             Vital signs reviewed  PE:   APPEARANCE: Well nourished, well developed, in no acute distress.    HEAD: Normocephalic, atraumatic.  EYES: EOMI.  Conjunctivae noninjected.  NOSE: Mucosa pink. Airway clear.  MOUTH & THROAT: No tonsillar enlargement. No pharyngeal erythema or exudate.   NECK: Supple with no cervical lymphadenopathy.    CHEST: Good inspiratory effort. Lungs clear to auscultation with no wheezes or crackles.  CARDIOVASCULAR: Normal S1, S2. No rubs, murmurs, or gallops.  ABDOMEN: Bowel sounds normal. Not distended. Soft. No tenderness or masses. No organomegaly.  EXTREMITIES: No edema, cyanosis, or clubbing.    Review of Systems   Constitutional:  Negative for chills, fatigue and fever.   HENT: Negative.     Respiratory:  Negative for cough, shortness of breath and wheezing.    Cardiovascular:  Negative for chest pain, palpitations and leg swelling.   Gastrointestinal: Negative.  Positive for reflux.   Genitourinary: Negative.    Musculoskeletal: Negative.    Neurological: Negative.    Psychiatric/Behavioral: Negative.     All other systems reviewed and are negative.      IMPRESSION  1. Annual physical exam    2. Migraine without aura and without status migrainosus, not intractable    3. Encounter for hepatitis C screening test for low risk patient    4. Screening for HIV (human immunodeficiency virus)    5. Encounter to establish care with new doctor    6. Gastroesophageal reflux disease, unspecified whether esophagitis present            PLAN      1. Annual physical exam    - CBC Auto Differential; Future  - Comprehensive Metabolic Panel; Future  - TSH; Future  - Lipid Panel; Future  - Hemoglobin A1C;  Future      2. Migraine without aura and without status migrainosus, not intractable    Stable    Advised to use Excedrin migraine as needed     Counseling provided on sleep hygiene    Encouraged to have good stress management strategy  To maintain enough hydration      3. Encounter for hepatitis C screening test for low risk patient    - Hepatitis C Antibody; Future      4. Screening for HIV (human immunodeficiency virus)    - HIV 1/2 Ag/Ab (4th Gen); Future      5. Encounter to establish care with new doctor    - CBC Auto Differential; Future  - Comprehensive Metabolic Panel; Future  - TSH; Future  - Lipid Panel; Future  - Hemoglobin A1C; Future      6. Gastroesophageal reflux disease, unspecified whether esophagitis present    Advised to eat dinner early, to have a gap of 2-3 hours between last meal of the day and bedtime     Avoid spicy and acidic foods     Advised to take over-the-counter Tums and Pepcid as needed         SCREENINGS      Immunizations:     Advised to have Tdap     Up-to-date with COVID vaccine      Age/demographic appropriate health maintenance:    Health Maintenance Due   Topic Date Due    Hepatitis C Screening  Never done    Lipid Panel  Never done    HIV Screening  Never done           Spent adequate time in obtaining history and explaining differentials     35 minutes spent during this visit of which greater than 50% devoted to face-face counseling and coordination of care regarding diagnosis and management plan       Yoly Amaral   6/28/2024

## 2024-07-02 ENCOUNTER — LAB VISIT (OUTPATIENT)
Dept: LAB | Facility: HOSPITAL | Age: 29
End: 2024-07-02
Attending: FAMILY MEDICINE
Payer: COMMERCIAL

## 2024-07-02 DIAGNOSIS — Z11.4 SCREENING FOR HIV (HUMAN IMMUNODEFICIENCY VIRUS): ICD-10-CM

## 2024-07-02 DIAGNOSIS — Z76.89 ENCOUNTER TO ESTABLISH CARE WITH NEW DOCTOR: ICD-10-CM

## 2024-07-02 DIAGNOSIS — Z11.59 ENCOUNTER FOR HEPATITIS C SCREENING TEST FOR LOW RISK PATIENT: ICD-10-CM

## 2024-07-02 DIAGNOSIS — Z00.00 ANNUAL PHYSICAL EXAM: ICD-10-CM

## 2024-07-02 LAB
ALBUMIN SERPL BCP-MCNC: 3.7 G/DL (ref 3.5–5.2)
ALP SERPL-CCNC: 154 U/L (ref 55–135)
ALT SERPL W/O P-5'-P-CCNC: 50 U/L (ref 10–44)
ANION GAP SERPL CALC-SCNC: 6 MMOL/L (ref 8–16)
AST SERPL-CCNC: 42 U/L (ref 10–40)
BASOPHILS # BLD AUTO: 0.09 K/UL (ref 0–0.2)
BASOPHILS NFR BLD: 1.8 % (ref 0–1.9)
BILIRUB SERPL-MCNC: 0.7 MG/DL (ref 0.1–1)
BUN SERPL-MCNC: 13 MG/DL (ref 6–20)
CALCIUM SERPL-MCNC: 9.8 MG/DL (ref 8.7–10.5)
CHLORIDE SERPL-SCNC: 102 MMOL/L (ref 95–110)
CHOLEST SERPL-MCNC: 193 MG/DL (ref 120–199)
CHOLEST/HDLC SERPL: 4.1 {RATIO} (ref 2–5)
CO2 SERPL-SCNC: 29 MMOL/L (ref 23–29)
CREAT SERPL-MCNC: 1.2 MG/DL (ref 0.5–1.4)
DIFFERENTIAL METHOD BLD: ABNORMAL
EOSINOPHIL # BLD AUTO: 0.4 K/UL (ref 0–0.5)
EOSINOPHIL NFR BLD: 8.1 % (ref 0–8)
ERYTHROCYTE [DISTWIDTH] IN BLOOD BY AUTOMATED COUNT: 12.2 % (ref 11.5–14.5)
EST. GFR  (NO RACE VARIABLE): >60 ML/MIN/1.73 M^2
ESTIMATED AVG GLUCOSE: 111 MG/DL (ref 68–131)
GLUCOSE SERPL-MCNC: 103 MG/DL (ref 70–110)
HBA1C MFR BLD: 5.5 % (ref 4–5.6)
HCT VFR BLD AUTO: 45.2 % (ref 40–54)
HCV AB SERPL QL IA: NORMAL
HDLC SERPL-MCNC: 47 MG/DL (ref 40–75)
HDLC SERPL: 24.4 % (ref 20–50)
HGB BLD-MCNC: 14.8 G/DL (ref 14–18)
HIV 1+2 AB+HIV1 P24 AG SERPL QL IA: NORMAL
IMM GRANULOCYTES # BLD AUTO: 0.01 K/UL (ref 0–0.04)
IMM GRANULOCYTES NFR BLD AUTO: 0.2 % (ref 0–0.5)
LDLC SERPL CALC-MCNC: 129.6 MG/DL (ref 63–159)
LYMPHOCYTES # BLD AUTO: 0.9 K/UL (ref 1–4.8)
LYMPHOCYTES NFR BLD: 18.1 % (ref 18–48)
MCH RBC QN AUTO: 27.5 PG (ref 27–31)
MCHC RBC AUTO-ENTMCNC: 32.7 G/DL (ref 32–36)
MCV RBC AUTO: 84 FL (ref 82–98)
MONOCYTES # BLD AUTO: 0.8 K/UL (ref 0.3–1)
MONOCYTES NFR BLD: 16.5 % (ref 4–15)
NEUTROPHILS # BLD AUTO: 2.8 K/UL (ref 1.8–7.7)
NEUTROPHILS NFR BLD: 55.3 % (ref 38–73)
NONHDLC SERPL-MCNC: 146 MG/DL
NRBC BLD-RTO: 0 /100 WBC
PLATELET # BLD AUTO: 246 K/UL (ref 150–450)
PMV BLD AUTO: 9.5 FL (ref 9.2–12.9)
POTASSIUM SERPL-SCNC: 4.2 MMOL/L (ref 3.5–5.1)
PROT SERPL-MCNC: 7.8 G/DL (ref 6–8.4)
RBC # BLD AUTO: 5.39 M/UL (ref 4.6–6.2)
SODIUM SERPL-SCNC: 137 MMOL/L (ref 136–145)
T4 FREE SERPL-MCNC: 1.02 NG/DL (ref 0.71–1.51)
TRIGL SERPL-MCNC: 82 MG/DL (ref 30–150)
TSH SERPL DL<=0.005 MIU/L-ACNC: 0.32 UIU/ML (ref 0.4–4)
WBC # BLD AUTO: 5.04 K/UL (ref 3.9–12.7)

## 2024-07-02 PROCEDURE — 36415 COLL VENOUS BLD VENIPUNCTURE: CPT | Performed by: FAMILY MEDICINE

## 2024-07-02 PROCEDURE — 84439 ASSAY OF FREE THYROXINE: CPT | Performed by: FAMILY MEDICINE

## 2024-07-02 PROCEDURE — 84443 ASSAY THYROID STIM HORMONE: CPT | Performed by: FAMILY MEDICINE

## 2024-07-02 PROCEDURE — 83036 HEMOGLOBIN GLYCOSYLATED A1C: CPT | Performed by: FAMILY MEDICINE

## 2024-07-02 PROCEDURE — 80061 LIPID PANEL: CPT | Performed by: FAMILY MEDICINE

## 2024-07-02 PROCEDURE — 87389 HIV-1 AG W/HIV-1&-2 AB AG IA: CPT | Performed by: FAMILY MEDICINE

## 2024-07-02 PROCEDURE — 80053 COMPREHEN METABOLIC PANEL: CPT | Performed by: FAMILY MEDICINE

## 2024-07-02 PROCEDURE — 85025 COMPLETE CBC W/AUTO DIFF WBC: CPT | Performed by: FAMILY MEDICINE

## 2024-07-02 PROCEDURE — 86803 HEPATITIS C AB TEST: CPT | Performed by: FAMILY MEDICINE
